# Patient Record
Sex: FEMALE | Race: WHITE | NOT HISPANIC OR LATINO | URBAN - METROPOLITAN AREA
[De-identification: names, ages, dates, MRNs, and addresses within clinical notes are randomized per-mention and may not be internally consistent; named-entity substitution may affect disease eponyms.]

---

## 2021-10-05 ENCOUNTER — OUTPATIENT (OUTPATIENT)
Dept: OUTPATIENT SERVICES | Facility: HOSPITAL | Age: 36
LOS: 1 days | Discharge: HOME | End: 2021-10-05

## 2021-10-05 DIAGNOSIS — Z15.09 GENETIC SUSCEPTIBILITY TO OTHER MALIGNANT NEOPLASM: ICD-10-CM

## 2021-10-06 ENCOUNTER — APPOINTMENT (OUTPATIENT)
Dept: HEMATOLOGY ONCOLOGY | Facility: CLINIC | Age: 36
End: 2021-10-06

## 2021-10-06 PROBLEM — Z00.00 ENCOUNTER FOR PREVENTIVE HEALTH EXAMINATION: Status: ACTIVE | Noted: 2021-10-06

## 2021-10-20 ENCOUNTER — TRANSCRIPTION ENCOUNTER (OUTPATIENT)
Age: 36
End: 2021-10-20

## 2021-10-20 ENCOUNTER — NON-APPOINTMENT (OUTPATIENT)
Age: 36
End: 2021-10-20

## 2022-02-09 ENCOUNTER — APPOINTMENT (OUTPATIENT)
Dept: MRI IMAGING | Facility: CLINIC | Age: 37
End: 2022-02-09
Payer: COMMERCIAL

## 2022-02-09 PROCEDURE — A9585: CPT | Mod: JW

## 2022-02-09 PROCEDURE — 74183 MRI ABD W/O CNTR FLWD CNTR: CPT

## 2022-02-14 ENCOUNTER — NON-APPOINTMENT (OUTPATIENT)
Age: 37
End: 2022-02-14

## 2022-02-16 PROBLEM — Z80.0 FAMILY HISTORY OF PANCREATIC CANCER: Status: ACTIVE | Noted: 2022-02-16

## 2022-02-16 PROBLEM — Z78.9 NON-SMOKER: Status: ACTIVE | Noted: 2022-02-16

## 2022-02-17 ENCOUNTER — APPOINTMENT (OUTPATIENT)
Dept: SURGICAL ONCOLOGY | Facility: CLINIC | Age: 37
End: 2022-02-17
Payer: COMMERCIAL

## 2022-02-17 DIAGNOSIS — Z78.9 OTHER SPECIFIED HEALTH STATUS: ICD-10-CM

## 2022-02-17 DIAGNOSIS — Z80.0 FAMILY HISTORY OF MALIGNANT NEOPLASM OF DIGESTIVE ORGANS: ICD-10-CM

## 2022-02-17 PROCEDURE — 99204 OFFICE O/P NEW MOD 45 MIN: CPT | Mod: 95

## 2022-02-17 NOTE — HISTORY OF PRESENT ILLNESS
[de-identified] : This visit was provided via telehealth using real-time 2-way audio visual technology. The patient, ZAHRA VARGAS , was located at home [address] [place] [address]\[place] [address] at the time of the visit. This provider was located at the clinic in [place], [place] at the time of the visit. The provider, patient and []  participated in the telehealth encounter.  Verbal consent was given 02/17/2022 by the patient.\par \par 36F referred by Dr. Kelly Thakur/ [facility] genetics team.\par \par SHe underwent genetic testing in October 2021 and no pathogenic mutations were identified. \par \par She has no personal history of malignancy, pancreatitis, or diabetes. She is otherwise healthy. She denies abdominal pain, jaundice, nausea/ vomiting/ signs of exocrine insufficiency. \par \par She has a strong family history of pancreatic cancer in 1-first degree (father) and 2 second-degree (paternal aunt and uncle) relatives and breast cancer. Father underwent genetic testing at [facility] in 2011 and was negative for BRCA or PALB2 mutation. \par \par 2//9/22: She underwent baseline MRI/ MRCP. No pancreatic abnormality. \par

## 2022-02-17 NOTE — ASSESSMENT
[FreeTextEntry1] : 36F with elevated risk of pancreatic cancer based on family history. \par Father/ paternal aunt/ paternal uncle- all diagnosed age 59 and  age 60. \par She presents to discuss options for pancreatic cancer screening. \par No pathogenic mutation was identified on genetic testing in . \par Baseline MRI 2022 was reviewed and is normal. \par \par We discussed the indications for screening a patient at increased risk for pancreatic cancer. \par At this time these include: \par 1. STK 11/ CDKN2A mutation\par 2. A known/ likely pathogenic germline variant in a pancreatic cancer susceptibility gene (BR[place]N, BRCA 1, BRCA 2, MLH1, MSH2, MSH6, EPCAM, PALB2, TP53) AND a family history of from the same side of the family (first or second degree relative)\par 3. Family history of exocrine pancreatic cancer in 2+ first degree relatives from same side of family. \par 4. Family history of exocrine pancreatic cancer in 3+ first and/ or second degree relatives from same side of family. \par \par Based on these criteria (#4), she qualifies for high-risk screening. We discussed the benefits of a screening program, as well as limitations and potential risks. She would like to proceed with screening. \par \par I discussed symptoms that would raise my concern for pancreatic cancer including unintended weight loss, jaundice, abdominal/ back pain, fatty/greasy stools, and new onset diabetes. Should these develop, she should call immediately. \par \par In the absence of new symptoms I recommend continued surveillance with annual MRI. I discussed the role of EUS as a complementary study and that I think it is reasonable to pursue this approximately 10 years before earliest pancreatic cancer diagnosis in her family.  I also discussed that our surveillance recommendations may change over time as the literature continues to evolve. \par \par Plan: \par Follow up with MRI in 1 year\par EUS at age: approx 48\par

## 2022-02-17 NOTE — REASON FOR VISIT
[Initial Consultation] : an initial consultation for [FreeTextEntry2] : High risk screening pancreatic cancer

## 2023-01-31 ENCOUNTER — APPOINTMENT (OUTPATIENT)
Dept: MRI IMAGING | Facility: CLINIC | Age: 38
End: 2023-01-31
Payer: COMMERCIAL

## 2023-01-31 PROCEDURE — 74183 MRI ABD W/O CNTR FLWD CNTR: CPT

## 2023-01-31 PROCEDURE — A9585: CPT

## 2023-02-17 ENCOUNTER — APPOINTMENT (OUTPATIENT)
Dept: SURGICAL ONCOLOGY | Facility: CLINIC | Age: 38
End: 2023-02-17
Payer: COMMERCIAL

## 2023-02-17 ENCOUNTER — APPOINTMENT (OUTPATIENT)
Dept: SURGICAL ONCOLOGY | Facility: CLINIC | Age: 38
End: 2023-02-17

## 2023-02-17 ENCOUNTER — NON-APPOINTMENT (OUTPATIENT)
Age: 38
End: 2023-02-17

## 2023-02-17 PROCEDURE — 99214 OFFICE O/P EST MOD 30 MIN: CPT | Mod: 95

## 2023-02-17 NOTE — ASSESSMENT
[FreeTextEntry1] : 37F with elevated risk of pancreatic cancer based on family history. \par Genetic testing normal. \par Father/ paternal aunt/ paternal uncle- all diagnosed age 59 and  age 60. \par She continues in high risk screening program. \par No pathogenic mutation was identified on genetic testing in . \par Baseline MRI 2022 was reviewed and is normal. \par \par We discussed the indications for screening a patient at increased risk for pancreatic cancer. \par At this time these include: \par 1. STK 11/ CDKN2A mutation\par 2. A known/ likely pathogenic germline variant in a pancreatic cancer susceptibility gene (BR[place]N, BRCA 1, BRCA 2, MLH1, MSH2, MSH6, EPCAM, PALB2, TP53) AND a family history of from the same side of the family (first or second degree relative)\par 3. Family history of exocrine pancreatic cancer in 2+ first degree relatives from same side of family. \par 4. Family history of exocrine pancreatic cancer in 3+ first and/ or second degree relatives from same side of family. \par \par Based on these criteria (#4), she qualifies for high-risk screening. We discussed the benefits of a screening program, as well as limitations and potential risks. She would like to proceed with screening. \par \par I discussed symptoms that would raise my concern for pancreatic cancer including unintended weight loss, jaundice, abdominal/ back pain, fatty/greasy stools, and new onset diabetes. Should these develop, she should call immediately. \par \par In the absence of new symptoms I recommend continued surveillance with annual MRI. I discussed the role of EUS as a complementary study and that I think it is reasonable to pursue this approximately 10 years before earliest pancreatic cancer diagnosis in her family.  I also discussed that our surveillance recommendations may change over time as the literature continues to evolve. \par \par Plan: \par Follow up with MRI in 1 year\par EUS at age: approx 48\par

## 2023-02-17 NOTE — HISTORY OF PRESENT ILLNESS
Discussed home care and s/s to return to ER. Patient sent with prescriptions.       Get Nobles RN  06/06/22 [id number]
[de-identified] : This telephonic visit was provided via real-time 2 way audio visual technology. The patient, ZAHRA VARGAS, was located at home, [address]\[place] [address] , at the time of the visit. \par The provider, was located at at the clinic at [address] [place] [address] at the time of the visit. The patient, ZAHRA VARGAS  and provider participated in the telehealth encounter. \par Verbal consent was given on 02/17/2023  by the patient.\par \par 37F referred by Dr. Kelly Thakur/ [facility] genetics team.\par \par She underwent genetic testing in October 2021 and no pathogenic mutations were identified. \par \par She has no personal history of malignancy, pancreatitis, or diabetes. She is otherwise healthy. She denies abdominal pain, jaundice, nausea/ vomiting/ signs of exocrine insufficiency. \par \par She has a strong family history of pancreatic cancer in 1-first degree (father) and 2 second-degree (paternal aunt and uncle) relatives and breast cancer. Father underwent genetic testing at [facility] in 2011 and was negative for BRCA or PALB2 mutation. \par \par 2//9/22: She underwent baseline MRI/ MRCP. No pancreatic abnormality. \par \par 1/31/23 MR MRCP: No evidence of pancreatic mass. Stable mild prominence of central intrahepatic ducts, possibly post cholecystectomy.  renal cysts.

## 2024-01-25 ENCOUNTER — APPOINTMENT (OUTPATIENT)
Dept: MRI IMAGING | Facility: CLINIC | Age: 39
End: 2024-01-25
Payer: COMMERCIAL

## 2024-01-25 PROCEDURE — 74183 MRI ABD W/O CNTR FLWD CNTR: CPT

## 2024-01-25 PROCEDURE — A9585: CPT | Mod: JW

## 2024-02-08 ENCOUNTER — APPOINTMENT (OUTPATIENT)
Dept: SURGICAL ONCOLOGY | Facility: CLINIC | Age: 39
End: 2024-02-08
Payer: COMMERCIAL

## 2024-02-08 PROCEDURE — 99213 OFFICE O/P EST LOW 20 MIN: CPT

## 2024-02-08 NOTE — HISTORY OF PRESENT ILLNESS
[de-identified] : 37F referred by Dr. Kelly Thakur/ [facility] genetics team. Presents for a 1 year follow up for PDAC screening.She underwent genetic testing in October 2021 and no pathogenic mutations were identified. She has no personal history of malignancy, pancreatitis, or diabetes. She is otherwise healthy.   She has a strong family history of pancreatic cancer in 1-first degree (father) and 2 second-degree (paternal aunt and uncle) relatives and breast cancer. Father underwent genetic testing at [facility] in 2011 and was negative for BRCA or PALB2 mutation.   2//9/22: She underwent baseline MRI/ MRCP. No pancreatic abnormality.   1/31/23 MR MRCP: No evidence of pancreatic mass. Stable mild prominence of central intrahepatic ducts, possibly post cholecystectomy.  renal cysts.  1/24/24 MRI MRCP: Normal MR appearance of the pancreas. No suspicious pancreatic lesion.   Presents today to discuss recent scan results and follow up plan.  She denies [abdominal pain, jaundice, nausea/ vomiting/ signs of exocrine insufficiency.]

## 2024-02-08 NOTE — ASSESSMENT
[FreeTextEntry1] : 37F with elevated risk of pancreatic cancer based on family history.  Genetic testing normal.  Father/ paternal aunt/ paternal uncle- all diagnosed age 59 and  age 60.  She continues in high risk screening program.  No pathogenic mutation was identified on genetic testing in .   Presents for a 1 year follow up for ongoing PDAC screening.  I reviewed her 24 MRI MRCP that demonstrates normal MR appearance of the pancreas. No suspicious pancreatic lesion.   We discussed the indications for screening a patient at increased risk for pancreatic cancer.  At this time these include:  1. STK 11/ CDKN2A mutation 2. A known/ likely pathogenic germline variant in a pancreatic cancer susceptibility gene (BR[place]N, BRCA 1, BRCA 2, MLH1, MSH2, MSH6, EPCAM, PALB2, TP53) AND a family history of from the same side of the family (first or second degree relative) 3. Family history of exocrine pancreatic cancer in 2+ first degree relatives from same side of family.  4. Family history of exocrine pancreatic cancer in 3+ first and/ or second degree relatives from same side of family.   Based on these criteria (#4), she qualifies for high-risk screening. We discussed the benefits of a screening program, as well as limitations and potential risks. She would like to continue with screening.   I discussed symptoms that would raise my concern for pancreatic cancer including unintended weight loss, jaundice, abdominal/ back pain, fatty/greasy stools, and new onset diabetes. Should these develop, she should call immediately.   In the absence of new symptoms I recommend continued surveillance with annual MRI. I discussed the role of EUS as a complementary study and that I think it is reasonable to pursue this approximately 10 years before earliest pancreatic cancer diagnosis in her family.  I also discussed that our surveillance recommendations may change over time as the literature continues to evolve.   Plan:  Follow up with MRI in 1 year EUS at age: approx 48 FU with Dawn Black after MRI

## 2024-08-27 ENCOUNTER — NON-APPOINTMENT (OUTPATIENT)
Age: 39
End: 2024-08-27

## 2024-08-30 ENCOUNTER — NON-APPOINTMENT (OUTPATIENT)
Age: 39
End: 2024-08-30

## 2024-09-03 ENCOUNTER — APPOINTMENT (OUTPATIENT)
Dept: OPHTHALMOLOGY | Facility: CLINIC | Age: 39
End: 2024-09-03
Payer: COMMERCIAL

## 2024-09-03 ENCOUNTER — NON-APPOINTMENT (OUTPATIENT)
Age: 39
End: 2024-09-03

## 2024-09-03 ENCOUNTER — APPOINTMENT (OUTPATIENT)
Dept: OPHTHALMOLOGY | Facility: CLINIC | Age: 39
End: 2024-09-03
Payer: SELF-PAY

## 2024-09-03 PROCEDURE — 92004 COMPRE OPH EXAM NEW PT 1/>: CPT

## 2024-09-03 PROCEDURE — 92015 DETERMINE REFRACTIVE STATE: CPT

## 2025-01-31 ENCOUNTER — APPOINTMENT (OUTPATIENT)
Dept: MRI IMAGING | Facility: CLINIC | Age: 40
End: 2025-01-31

## 2025-09-02 ENCOUNTER — APPOINTMENT (OUTPATIENT)
Dept: OPHTHALMOLOGY | Facility: CLINIC | Age: 40
End: 2025-09-02